# Patient Record
(demographics unavailable — no encounter records)

---

## 2025-06-25 NOTE — ASSESSMENT
[FreeTextEntry1] : 25 yo M presenting to establish care for suspected UC dx in Jan 2025 currently on Mesalamine 4.8 gm/day with continued urgency, bloody diarrhea and weight loss with occasional nausea/vomiting.  Suspected dx at this time is UC following Jan 2025 flex sig, though occasional nausea/vomiting along with significant weight loss raises possibility of underlying Crohns and associated small bowel disease.  Nonetheless, advanced therapy at this point is warranted for severe disease with ongoing active inflammation consistent with exacerbation, along with initiation of prednisone given severity of sx.  Plan: -Labs today + pre-biologic eval -Stool studies (Cdiff, GI PCR, Fecal calprotectin) -Pred 20 mg daily along with calcium/vit D supplementation -Can continue mesalamine in interim -CTE to assess small bowel and elucidate UC vs Crohns -Tentatively plan for Vedolizumab itherapy.  Will initiate PA today, though pending eval above may reconsider therapy. -Meet with dietician today given weight loss to help optimize caloric/macronutritient intake  Follow up  TTM after 2nd loading dose  or sooner if sx do not improve   The risks and special considerations of the Integrin Receptor Antagonist medication Vedolizumab (Entyvio) were discussed with the patient. This includes local side effects and intolerances at the medication administration site (redness, itching, bruising, pain, or swelling), systemic side effects (headache, fever, chills, hives, rashes, infusion reactions, or anaphylaxis), increased risk of infections (bacterial, fungal, and viral), and liver injury. To mitigate these risks, baseline CBC, CMP, Hepatitis B, and QuantiFERON TB studies will be obtained prior to initial medication administration and periodic labs for medication monitoring. We recommend appropriate immunization (influenza, pneumonia, respiratory syncytial virus, herpes zoster, hepatitis B). Patient instructed to avoid live vaccinations while on biologic therapy and for 4 weeks after discontinuation. Patient understands to notify the IBD team of pregnancy or upcoming major surgical operations, as this may require medication adjustment and collaboration between treating physicians. Patient given instructions for obtaining detailed resources on the medication through the drug manufacturers website as well as through the Crohns [employer]  Parminder MEJIA Fellow [facility]  The above recommendations were discussed at the time of the visit with the co-signing attending. Please see attending addendum for any additional recommendations.

## 2025-06-25 NOTE — HISTORY OF PRESENT ILLNESS
[FreeTextEntry1] : 23 yo M presenting to establish care for UC dx in Jan 2025 currently on Mesalamine 4.8 gm/day without significant improvement  In around Spring of 2024 he began noticing blood in stool. Has had prior similar sx in past but they resolved so he was expecting sx to do the same, however as the blood in stool persisted he sought GI evaluation with Dr. Maldonado and underwent flex sig on 1/14/2025 with following findings  -Continuous and circumferential inflammation from anus to sigmoid colon, 30 cm from anal verge (Deleon 2)  Sigmoid colon path: Active colitis with ulcer, without dysplasia Rectal Path: Chronic colitis, moderately active without dysplasia  Was given mesalamine 4.8 gm (1.2 gm x 4 tabs) after flex sig which seemed to improve the amount of blood however he continued to experience urgency and frequency of BMs-approx >10 lbs/day. Experiencing abdominal discomfort and cramping with BMs.  Occasional gets nauseous with vomiting when trying to have BM.  Denies rectal pain. Also noticing worsening fatigue along with weight loss. Peak at 180 lbs, now 163 lbs over 6 months.    No skin rashes. No specific joint pain.    No prior colonoscopy  PMhx: UC Medications: Mesalamine 4.8 gm (1.2 gm x 4 tabs/day) Surgical hx: MSK surgery when broke his leg at 10 yo Family hx: Maternal Uncle: Colitis? 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 Graduated [employer].  Non smoker.  Rare ETOH Allergies: NKDA

## 2025-06-25 NOTE — PHYSICAL EXAM
[No Respiratory Distress] : no respiratory distress [Respiration, Rhythm And Depth] : normal respiratory rhythm and effort [Abdomen Tenderness] : non-tender [No Masses] : no abdominal mass palpated [Abdomen Soft] : soft [Normal] : oriented to person, place, and time [de-identified] : No focal TTP

## 2025-06-25 NOTE — CONSULT LETTER
[Dear  ___] : Dear  [unfilled], [Consult Letter:] : I had the pleasure of evaluating your patient, [unfilled]. [Please see my note below.] : Please see my note below. [Consult Closing:] : Thank you very much for allowing me to participate in the care of this patient.  If you have any questions, please do not hesitate to contact me. [Sincerely,] : Sincerely, [FreeTextEntry3] : Juno Roman MD Professor of Medicine Chief of GI Director IBD Program [facility]

## 2025-06-25 NOTE — HISTORY OF PRESENT ILLNESS
[FreeTextEntry1] : 25 yo M presenting to establish care for UC dx in Jan 2025 currently on Mesalamine 4.8 gm/day without significant improvement  In around Spring of 2024 he began noticing blood in stool. Has had prior similar sx in past but they resolved so he was expecting sx to do the same, however as the blood in stool persisted he sought GI evaluation with Dr. Maldonado and underwent flex sig on 1/14/2025 with following findings  -Continuous and circumferential inflammation from anus to sigmoid colon, 30 cm from anal verge (Deleon 2)  Sigmoid colon path: Active colitis with ulcer, without dysplasia Rectal Path: Chronic colitis, moderately active without dysplasia  Was given mesalamine 4.8 gm (1.2 gm x 4 tabs) after flex sig which seemed to improve the amount of blood however he continued to experience urgency and frequency of BMs-approx >10 lbs/day. Experiencing abdominal discomfort and cramping with BMs.  Occasional gets nauseous with vomiting when trying to have BM.  Denies rectal pain. Also noticing worsening fatigue along with weight loss. Peak at 180 lbs, now 163 lbs over 6 months.    No skin rashes. No specific joint pain.    No prior colonoscopy  PMhx: UC Medications: Mesalamine 4.8 gm (1.2 gm x 4 tabs/day) Surgical hx: MSK surgery when broke his leg at 10 yo Family hx: Maternal Uncle: Colitis? 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 From [place]. Graduated [employer].  Non smoker.  Rare ETOH Allergies: NKDA

## 2025-06-25 NOTE — CONSULT LETTER
[Dear  ___] : Dear  [unfilled], [Consult Letter:] : I had the pleasure of evaluating your patient, [unfilled]. [Please see my note below.] : Please see my note below. [Consult Closing:] : Thank you very much for allowing me to participate in the care of this patient.  If you have any questions, please do not hesitate to contact me. [Sincerely,] : Sincerely, [FreeTextEntry3] : Juno Roman MD Professor of Medicine Chief of GI Director IBD Program [facility]

## 2025-06-25 NOTE — ASSESSMENT
[FreeTextEntry1] : 25 yo M presenting to establish care for suspected UC dx in Jan 2025 currently on Mesalamine 4.8 gm/day with continued urgency, bloody diarrhea and weight loss with occasional nausea/vomiting.  Suspected dx at this time is UC following Jan 2025 flex sig, though occasional nausea/vomiting along with significant weight loss raises possibility of underlying Crohns and associated small bowel disease.  Nonetheless, advanced therapy at this point is warranted for severe disease with ongoing active inflammation consistent with exacerbation, along with initiation of prednisone given severity of sx.  Plan: -Labs today + pre-biologic eval -Stool studies (Cdiff, GI PCR, Fecal calprotectin) -Pred 20 mg daily along with calcium/vit D supplementation -Can continue mesalamine in interim -CTE to assess small bowel and elucidate UC vs Crohns -Tentatively plan for Vedolizumab itherapy.  Will initiate PA today, though pending eval above may reconsider therapy. -Meet with dietician today given weight loss to help optimize caloric/macronutritient intake  Follow up  TTM after 2nd loading dose  or sooner if sx do not improve   The risks and special considerations of the Integrin Receptor Antagonist medication Vedolizumab (Entyvio) were discussed with the patient. This includes local side effects and intolerances at the medication administration site (redness, itching, bruising, pain, or swelling), systemic side effects (headache, fever, chills, hives, rashes, infusion reactions, or anaphylaxis), increased risk of infections (bacterial, fungal, and viral), and liver injury. To mitigate these risks, baseline CBC, CMP, Hepatitis B, and QuantiFERON TB studies will be obtained prior to initial medication administration and periodic labs for medication monitoring. We recommend appropriate immunization (influenza, pneumonia, respiratory syncytial virus, herpes zoster, hepatitis B). Patient instructed to avoid live vaccinations while on biologic therapy and for 4 weeks after discontinuation. Patient understands to notify the IBD team of pregnancy or upcoming major surgical operations, as this may require medication adjustment and collaboration between treating physicians. Patient given instructions for obtaining detailed resources on the medication through the drug manufacturers website as well as through the Crohns [employer] Colitis [employer]  Parminder MEJIA Fellow [facility]  The above recommendations were discussed at the time of the visit with the co-signing attending. Please see attending addendum for any additional recommendations.

## 2025-06-25 NOTE — PHYSICAL EXAM
[No Respiratory Distress] : no respiratory distress [Respiration, Rhythm And Depth] : normal respiratory rhythm and effort [Abdomen Tenderness] : non-tender [No Masses] : no abdominal mass palpated [Abdomen Soft] : soft [Normal] : oriented to person, place, and time [de-identified] : No focal TTP